# Patient Record
Sex: FEMALE | Race: WHITE | NOT HISPANIC OR LATINO | URBAN - METROPOLITAN AREA
[De-identification: names, ages, dates, MRNs, and addresses within clinical notes are randomized per-mention and may not be internally consistent; named-entity substitution may affect disease eponyms.]

---

## 2020-01-08 ENCOUNTER — TRANSFERRED RECORDS (OUTPATIENT)
Dept: HEALTH INFORMATION MANAGEMENT | Facility: CLINIC | Age: 25
End: 2020-01-08

## 2021-03-05 ENCOUNTER — OFFICE VISIT (OUTPATIENT)
Dept: PLASTIC SURGERY | Facility: CLINIC | Age: 26
End: 2021-03-05

## 2021-03-05 DIAGNOSIS — T18.0XXA FOREIGN BODY IN MOUTH, INITIAL ENCOUNTER: Primary | ICD-10-CM

## 2021-03-05 NOTE — LETTER
3/5/2021       RE: Angelina Correa  [address] [address]     Dear Colleague,    Thank you for referring your patient, Angelina Correa, to the [facility] at [facility]. Please see a copy of my visit note below.    Facial Plastic and Reconstructive Surgery Consultation    Referring Provider:   Referred Self, MD  No address on file    HPI:   I had the pleasure of seeing Angelina Correa today in clinic for consultation for possible chin implant complication.     Angelina Correa is a 25 year old female who in January of 2020 underwent a rhinoplasty and chin augmentation with silicone implant. She has noticed that it feels that the implant has become dependent and added more vertical height than projection. She also notes that she can feel the implant intraorally bilaterally by her gums. She feels like they have become more prominent and she is concerned that the implant may become exposed intraorally.    She would like to evaluate the position of the implant and would like to see if it needs correction.     Meds: Citalopram 20 mg  PMH: Depression  PSH: Rhinoplasty with chin augmentation with silicone implant    NKDA     Review Of Systems  ROS: 10 point ROS neg other than the symptoms noted above in the HPI.    There is no problem list on file for this patient.    No past surgical history on file.  No current outpatient medications on file.     Patient has no known allergies.  Social History     Socioeconomic History     Marital status: Single     Spouse name: Not on file     Number of children: Not on file     Years of education: Not on file     Highest education level: Not on file   Occupational History     Not on file   Social Needs     Financial resource strain: Not on file     Food insecurity     Worry: Not on file     Inability: Not on file     Transportation needs     Medical: Not on file     Non-medical: Not on  file   Tobacco Use     Smoking status: Current Every Day Smoker     Packs/day: 0.25     Smokeless tobacco: Never Used     Tobacco comment: couple cigs/day    Substance and Sexual Activity     Alcohol use: Yes     Comment: occ     Drug use: No     Sexual activity: Yes     Partners: Male   Lifestyle     Physical activity     Days per week: Not on file     Minutes per session: Not on file     Stress: Not on file   Relationships     Social connections     Talks on phone: Not on file     Gets together: Not on file     Attends Sikhism service: Not on file     Active member of club or organization: Not on file     Attends meetings of clubs or organizations: Not on file     Relationship status: Not on file     Intimate partner violence     Fear of current or ex partner: Not on file     Emotionally abused: Not on file     Physically abused: Not on file     Forced sexual activity: Not on file   Other Topics Concern     Parent/sibling w/ CABG, MI or angioplasty before 65F 55M? Not Asked   Social History Narrative     Not on file     No family history on file.    PE:  Alert and Oriented, Answering Questions Appropriately  Atraumatic, Normocephalic, Face Symmetric  Skin: Kaur 2  Facial Nerve Intact and facial movement symmetric  EOM's, PEERL  Nasal Exam: contour deformity of the profile with a dependent infratip lobule, tip nasal skin with notable scar, history of open septorhinoplasty with columellar scar.   Chin: wide long menton, with boxy shape, profile with length, with deficiency in projection, implant in palpable in a dependent position off of the border of the mandible.   Lips/Teeth/Toungue/Gums: Lips intact, Normal Dentition, Occlusion intact, Oral mucosa intact, no lesions/ulcerations/masses, Tongue mobile  The extended wings of the implant are palpable just on the alveolar ridge inferior to the molars, this is in a significantly more superior position and not along the mandibular border  Chest: No wheezing,  cyanosis, or stridor  Card: Normal upper extremity pulses and capillary refill, not diaphoretic  Neuro/Psych: CN's 2-12 intact, Moves all extremities, ambulation in intact, positive affect, no notable muscle weakness       IMPRESSION:    Malpositioned chin implant, with concern for future intraoral extrusion.      PLAN:    Angelina Correa has a malpositioned chin implant, which would need repositioning. This is in a position that can possibly cause the complication of intraoral extrusion if it is not corrected.     I discussed this with her today. I would use the submental incision and reposition the implant if it is appropriate. If the size and shape are the concern, then a new implant would need to be placed.         Photodocumentation was obtained.     I spent a total of 45  minutes face-to-face with Angelina Correa during today's office visit.  Over 50% of this time was spent counseling the patient and/or coordinating care regarding chin implant complications.  See note for details.        Again, thank you for allowing me to participate in the care of your patient.      Sincerely,    Jyotsna Singletary MD

## 2021-03-05 NOTE — LETTER
March 5, 2021  Re: Angelina Correa  1995    Dear Dr. Santiago,    Thank you so much for referring Angelina Correa to the [facility]. I had the pleasure of visiting with Angelina today.     Attached you will find a copy of my note. Please feel free to reach out to me with any questions, [phone].     Facial Plastic and Reconstructive Surgery Consultation    Referring Provider:   Referred Self, MD  No address on file    HPI:   I had the pleasure of seeing Angelina Correa today in clinic for consultation for possible chin implant complication.     Angelina Correa is a 25 year old female who in January of 2020 underwent a rhinoplasty and chin augmentation with silicone implant. She has noticed that it feels that the implant has become dependent and added more vertical height than projection. She also notes that she can feel the implant intraorally bilaterally by her gums. She feels like they have become more prominent and she is concerned that the implant may become exposed intraorally.    She would like to evaluate the position of the implant and would like to see if it needs correction.     Meds: Citalopram 20 mg  PMH: Depression  PSH: Rhinoplasty with chin augmentation with silicone implant    NKDA     Review Of Systems  ROS: 10 point ROS neg other than the symptoms noted above in the HPI.    There is no problem list on file for this patient.    No past surgical history on file.  No current outpatient medications on file.     Patient has no known allergies.  Social History     Socioeconomic History     Marital status: Single     Spouse name: Not on file     Number of children: Not on file     Years of education: Not on file     Highest education level: Not on file   Occupational History     Not on file   Social Needs     Financial resource strain: Not on file     Food insecurity     Worry: Not on file     Inability: Not on file     Transportation needs     Medical: Not on file      Non-medical: Not on file   Tobacco Use     Smoking status: Current Every Day Smoker     Packs/day: 0.25     Smokeless tobacco: Never Used     Tobacco comment: couple cigs/day    Substance and Sexual Activity     Alcohol use: Yes     Comment: occ     Drug use: No     Sexual activity: Yes     Partners: Male   Lifestyle     Physical activity     Days per week: Not on file     Minutes per session: Not on file     Stress: Not on file   Relationships     Social connections     Talks on phone: Not on file     Gets together: Not on file     Attends Episcopal service: Not on file     Active member of club or organization: Not on file     Attends meetings of clubs or organizations: Not on file     Relationship status: Not on file     Intimate partner violence     Fear of current or ex partner: Not on file     Emotionally abused: Not on file     Physically abused: Not on file     Forced sexual activity: Not on file   Other Topics Concern     Parent/sibling w/ CABG, MI or angioplasty before 65F 55M? Not Asked   Social History Narrative     Not on file     No family history on file.    PE:  Alert and Oriented, Answering Questions Appropriately  Atraumatic, Normocephalic, Face Symmetric  Skin: Kaur 2  Facial Nerve Intact and facial movement symmetric  EOM's, PEERL  Nasal Exam: contour deformity of the profile with a dependent infratip lobule, tip nasal skin with notable scar, history of open septorhinoplasty with columellar scar.   Chin: wide long menton, with boxy shape, profile with length, with deficiency in projection, implant in palpable in a dependent position off of the border of the mandible.   Lips/Teeth/Toungue/Gums: Lips intact, Normal Dentition, Occlusion intact, Oral mucosa intact, no lesions/ulcerations/masses, Tongue mobile  The extended wings of the implant are palpable just on the alveolar ridge inferior to the molars, this is in a significantly more superior position and not along the mandibular  border  Chest: No wheezing, cyanosis, or stridor  Card: Normal upper extremity pulses and capillary refill, not diaphoretic  Neuro/Psych: CN's 2-12 intact, Moves all extremities, ambulation in intact, positive affect, no notable muscle weakness       IMPRESSION:    Malpositioned chin implant, with concern for future intraoral extrusion.      PLAN:    Angelina Correa has a malpositioned chin implant, which would need repositioning. This is in a position that can possibly cause the complication of intraoral extrusion if it is not corrected.     I discussed this with her today. I would use the submental incision and reposition the implant if it is appropriate. If the size and shape are the concern, then a new implant would need to be placed.         Photodocumentation was obtained.     I spent a total of 45  minutes face-to-face with Angelina Correa during today's office visit.  Over 50% of this time was spent counseling the patient and/or coordinating care regarding chin implant complications.  See note for details.              Your trust in our practice and care is much appreciated.    Sincerely,  Jyotsna Singletary MD

## 2021-03-05 NOTE — PROGRESS NOTES
Facial Plastic and Reconstructive Surgery Consultation    Referring Provider:   Referred Self, MD  No address on file    HPI:   I had the pleasure of seeing Angelina Correa today in clinic for consultation for possible chin implant complication.     Angelina Correa is a 25 year old female who in January of 2020 underwent a rhinoplasty and chin augmentation with silicone implant. She has noticed that it feels that the implant has become dependent and added more vertical height than projection. She also notes that she can feel the implant intraorally bilaterally by her gums. She feels like they have become more prominent and she is concerned that the implant may become exposed intraorally.    She would like to evaluate the position of the implant and would like to see if it needs correction.     Meds: Citalopram 20 mg  PMH: Depression  PSH: Rhinoplasty with chin augmentation with silicone implant    NKDA     Review Of Systems  ROS: 10 point ROS neg other than the symptoms noted above in the HPI.    There is no problem list on file for this patient.    No past surgical history on file.  No current outpatient medications on file.     Patient has no known allergies.  Social History     Socioeconomic History     Marital status: Single     Spouse name: Not on file     Number of children: Not on file     Years of education: Not on file     Highest education level: Not on file   Occupational History     Not on file   Social Needs     Financial resource strain: Not on file     Food insecurity     Worry: Not on file     Inability: Not on file     Transportation needs     Medical: Not on file     Non-medical: Not on file   Tobacco Use     Smoking status: Current Every Day Smoker     Packs/day: 0.25     Smokeless tobacco: Never Used     Tobacco comment: couple cigs/day    Substance and Sexual Activity     Alcohol use: Yes     Comment: occ     Drug use: No     Sexual activity: Yes     Partners: Male   Lifestyle      Physical activity     Days per week: Not on file     Minutes per session: Not on file     Stress: Not on file   Relationships     Social connections     Talks on phone: Not on file     Gets together: Not on file     Attends Lutheran service: Not on file     Active member of club or organization: Not on file     Attends meetings of clubs or organizations: Not on file     Relationship status: Not on file     Intimate partner violence     Fear of current or ex partner: Not on file     Emotionally abused: Not on file     Physically abused: Not on file     Forced sexual activity: Not on file   Other Topics Concern     Parent/sibling w/ CABG, MI or angioplasty before 65F 55M? Not Asked   Social History Narrative     Not on file     No family history on file.    PE:  Alert and Oriented, Answering Questions Appropriately  Atraumatic, Normocephalic, Face Symmetric  Skin: Kaur 2  Facial Nerve Intact and facial movement symmetric  EOM's, PEERL  Nasal Exam: contour deformity of the profile with a dependent infratip lobule, tip nasal skin with notable scar, history of open septorhinoplasty with columellar scar.   Chin: wide long menton, with boxy shape, profile with length, with deficiency in projection, implant in palpable in a dependent position off of the border of the mandible.   Lips/Teeth/Toungue/Gums: Lips intact, Normal Dentition, Occlusion intact, Oral mucosa intact, no lesions/ulcerations/masses, Tongue mobile  The extended wings of the implant are palpable just on the alveolar ridge inferior to the molars, this is in a significantly more superior position and not along the mandibular border  Chest: No wheezing, cyanosis, or stridor  Card: Normal upper extremity pulses and capillary refill, not diaphoretic  Neuro/Psych: CN's 2-12 intact, Moves all extremities, ambulation in intact, positive affect, no notable muscle weakness       IMPRESSION:    Malpositioned chin implant, with concern for future intraoral  extrusion.      PLAN:    Angelina Correa has a malpositioned chin implant, which would need repositioning. This is in a position that can possibly cause the complication of intraoral extrusion if it is not corrected.     I discussed this with her today. I would use the submental incision and reposition the implant if it is appropriate. If the size and shape are the concern, then a new implant would need to be placed.         Photodocumentation was obtained.     I spent a total of 45  minutes face-to-face with Angelina Correa during today's office visit.  Over 50% of this time was spent counseling the patient and/or coordinating care regarding chin implant complications.  See note for details.

## 2021-03-15 NOTE — NURSING NOTE
Photodocumentation obtained.    Will work to obtain PA for removal and repositioning of chin implant due to malposition initially.    Pt will have original op note sent to us.    Veronica Nuñez RN  3/15/2021 12:56 PM

## 2021-03-26 ENCOUNTER — TELEPHONE (OUTPATIENT)
Dept: OTOLARYNGOLOGY | Facility: CLINIC | Age: 26
End: 2021-03-26

## 2021-03-26 NOTE — TELEPHONE ENCOUNTER
Returned call to pt re: scheduling surgery for Chin augmentation with repositioning of implant and cosmetic nasal surgery.    I told pt that we are currently trying to get prior auth for the chin augmentation d/t her history of a malpositioned implant.    Dr. Singletary would like pt to come in for another consult to address her cosmetic desires for nasal surgery.  She and Dr. Singletary primarily discussed the chin at her previous appt, so another consult to discuss the nose is needed.  I told the pt that there would be no cosmetic fee for this visit.    Pt agreeable to this plan and will call back when ready to schedule the appt with Dr. Singletary.    Veronica Nuñez RN  3/26/2021 4:17 PM      
normal...

## 2021-03-29 DIAGNOSIS — T85.698A: Primary | ICD-10-CM

## 2021-03-29 RX ORDER — CEFAZOLIN SODIUM 2 G/50ML
2 SOLUTION INTRAVENOUS
Status: CANCELLED | OUTPATIENT
Start: 2021-03-29

## 2021-03-29 RX ORDER — CEFAZOLIN SODIUM 2 G/50ML
2 SOLUTION INTRAVENOUS SEE ADMIN INSTRUCTIONS
Status: CANCELLED | OUTPATIENT
Start: 2021-03-29

## 2021-04-12 ENCOUNTER — HOSPITAL ENCOUNTER (OUTPATIENT)
Facility: AMBULATORY SURGERY CENTER | Age: 26
End: 2021-04-12
Attending: OTOLARYNGOLOGY
Payer: COMMERCIAL

## 2021-04-12 DIAGNOSIS — T85.698A: ICD-10-CM

## 2021-06-11 ENCOUNTER — OFFICE VISIT (OUTPATIENT)
Dept: PLASTIC SURGERY | Facility: CLINIC | Age: 26
End: 2021-06-11
Payer: COMMERCIAL

## 2021-06-11 DIAGNOSIS — Z41.1 ENCOUNTER FOR COSMETIC PROCEDURE: Primary | ICD-10-CM

## 2021-06-14 ENCOUNTER — DOCUMENTATION ONLY (OUTPATIENT)
Dept: OTOLARYNGOLOGY | Facility: CLINIC | Age: 26
End: 2021-06-14

## 2021-06-14 NOTE — PROGRESS NOTES
Cosmetic quote drafted for Revision Rhinoplasty with Cadaver Rib Graft vs Ear Graft, Removal and Replacement of Chin Implant (see Media tab).    Quote was emailed to pt, per her request, at [email or web address]    Pt to call the clinic if she has any questions re: the quote, and/or if she's ready to proceed with cosmetic surgery.    Veornica Nuñez RN  6/14/2021 1:22 PM

## 2021-06-21 ENCOUNTER — DOCUMENTATION ONLY (OUTPATIENT)
Dept: OTOLARYNGOLOGY | Facility: CLINIC | Age: 26
End: 2021-06-21

## 2021-06-21 NOTE — PROGRESS NOTES
Pt scheduled to have Cosmetic Revision Rhinoplasty with possible Cadaver Rib vs Ear Graft, Removal and Replacement of Chin Implant on Monday, 8/9/21 at [facility] with Dr. Singletary.    $500 non-refundable deposit collected to secure the surgery date.    Veronica Nuñez RN  6/21/2021 2:30 PM

## 2021-06-28 ENCOUNTER — DOCUMENTATION ONLY (OUTPATIENT)
Dept: OTOLARYNGOLOGY | Facility: CLINIC | Age: 26
End: 2021-06-28

## 2021-06-28 NOTE — PROGRESS NOTES
Pt scheduled to have surgery on 8/9/21 at [facility] with Dr. Singletary.    Orders faxed to [facility].    Surgery packet emailed to pt at [email or web address].    Veronica Nuñez RN  6/28/2021 12:20 PM

## 2021-07-23 ENCOUNTER — DOCUMENTATION ONLY (OUTPATIENT)
Dept: OTOLARYNGOLOGY | Facility: CLINIC | Age: 26
End: 2021-07-23

## 2021-07-23 NOTE — PROGRESS NOTES
FMLA and Return to Work paperwork faxed to Dr. Scribbles at [phone]    Pt scheduled to have Cosmetic Revision Rhinoplasty, Possible Conchal Ear vs Cadaver Rib Graft, Removal and Replacement of Chin Implant on 8/9/21 @ [facility].    Veronica Nuñez RN  7/23/2021 3:43 PM

## 2021-09-21 NOTE — PROGRESS NOTES
Facial Plastic and Reconstructive Surgery Consultation    Referring Provider:   Referred Self, MD  No address on file    HPI:   I had the pleasure of seeing Angelina Correa today in clinic for consultation for nasal surgery and revision of chin augmentation.  Angelina Correa is a 26 year old female who in 1/8/2020 underwent open reductive rhinoplasty with extended anatomical large chin implant with Dr. Penn. She had reduction of a dorsal convexity in addition to reduction of the tip for tip definition.     She states that her breathing through her nose has been challenging since her surgery. She feels constricted moving air from either side of her nose. She also feels that the chin implant is in poor position. She notes that it is wide and that it can be felt at the edge of her jaw.        Review Of Systems  ROS: 10 point ROS neg other than the symptoms noted above in the HPI.    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  No current outpatient medications on file.     Patient has no known allergies.  Social History     Socioeconomic History     Marital status: Single     Spouse name: Not on file     Number of children: Not on file     Years of education: Not on file     Highest education level: Not on file   Occupational History     Not on file   Tobacco Use     Smoking status: Current Every Day Smoker     Packs/day: 0.25     Smokeless tobacco: Never Used     Tobacco comment: couple cigs/day    Substance and Sexual Activity     Alcohol use: Yes     Comment: occ     Drug use: No     Sexual activity: Yes     Partners: Male   Other Topics Concern     Parent/sibling w/ CABG, MI or angioplasty before 65F 55M? Not Asked   Social History Narrative     Not on file     Social Determinants of Health     Financial Resource Strain:      Difficulty of Paying Living Expenses:    Food Insecurity:      Worried About Running  Out of Food in the Last Year:      Ran Out of Food in the Last Year:    Transportation Needs:      Lack of Transportation (Medical):      Lack of Transportation (Non-Medical):    Physical Activity:      Days of Exercise per Week:      Minutes of Exercise per Session:    Stress:      Feeling of Stress :    Social Connections:      Frequency of Communication with Friends and Family:      Frequency of Social Gatherings with Friends and Family:      Attends Yazdanism Services:      Active Member of Clubs or Organizations:      Attends Club or Organization Meetings:      Marital Status:    Intimate Partner Violence:      Fear of Current or Ex-Partner:      Emotionally Abused:      Physically Abused:      Sexually Abused:      No family history on file.    PE:  Alert and Oriented, Answering Questions Appropriately  Atraumatic, Normocephalic, Face Symmetric  Skin: Kaur 2  Facial Nerve Intact and facial movement symmetric  EOM's, PEERL  Nasal Exam:  Narrow nasal vault with narrow midvault, loss of internal nasal valve support with collapse, modified jayce maneuver leads to a significant improvement in nasal breathing, no mucopurulence or polyps, no masses  Chin: excessive width on anterior view, edge of the implant is palpable off the mandible inferiorly, this leads to a dependence of the chin in this area.   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

## 2022-04-11 ENCOUNTER — DOCUMENTATION ONLY (OUTPATIENT)
Dept: OTOLARYNGOLOGY | Facility: CLINIC | Age: 27
End: 2022-04-11
Payer: COMMERCIAL

## 2022-04-11 NOTE — PROGRESS NOTES
Pt scheduled to have surgery with Dr. Singletary on 6/14/22 at [facility].    Surgery orders faxed to [facility].    Surgical packet emailed to pt ([email or web address]).    Pt has an appt scheduled with Dr. Singletary on 5/20/22 to further discuss details of surgery.    Veronica Nuñez RN  4/11/2022 1:35 PM

## 2022-05-20 ENCOUNTER — OFFICE VISIT (OUTPATIENT)
Dept: PLASTIC SURGERY | Facility: CLINIC | Age: 27
End: 2022-05-20
Payer: COMMERCIAL

## 2022-05-20 DIAGNOSIS — Z41.1 ELECTIVE PROCEDURE FOR UNACCEPTABLE COSMETIC APPEARANCE: Primary | ICD-10-CM

## 2022-05-20 NOTE — PROGRESS NOTES
Facial Plastic and Reconstructive Surgery      Angelina Correa came in today to discuss the plan.  We morphed and looked together at her image and changes.    Chin: Submental approach, remove old implant, replace with medium or large, more projection centrally, less width    Nose: Superior dorsal take down and define glabella, increase tip projection, decrease infratip dependence, possible ear cartilage graft    She is in agreement with this plan .

## 2022-05-20 NOTE — LETTER
5/20/2022       RE: Angelina Correa  [address] [address]     Dear Colleague,    Thank you for referring your patient, Angelina Correa, to the [facility] at [facility]. Please see a copy of my visit note below.    Facial Plastic and Reconstructive Surgery      Angelina Correa came in today to discuss the plan.  We morphed and looked together at her image and changes.    Chin: Submental approach, remove old implant, replace with medium or large, more projection centrally, less width    Nose: Superior dorsal take down and define glabella, increase tip projection, decrease infratip dependence, possible ear cartilage graft    She is in agreement with this plan .         Sincerely,    Jyotsna Singletary MD

## 2022-05-20 NOTE — LETTER
May 20, 2022  Re: Angelina Correa  1995    Dear Dr. Santiago,    Thank you so much for referring Angelina Correa to the [facility]. I had the pleasure of visiting with Angelina today.     Attached you will find a copy of my note. Please feel free to reach out to me with any questions, [phone].     Facial Plastic and Reconstructive Surgery      Angelina Correa came in today to discuss the plan.  We morphed and looked together at her image and changes.    Chin: Submental approach, remove old implant, replace with medium or large, more projection centrally, less width    Nose: Superior dorsal take down and define glabella, increase tip projection, decrease infratip dependence, possible ear cartilage graft    She is in agreement with this plan .         Your trust in our practice and care is much appreciated.    Sincerely,  Jyotsna Singletary MD

## 2022-05-20 NOTE — LETTER
5/20/2022       RE: Angelina Correa  [address] [address]     Dear Colleague,    Thank you for referring your patient, Angelina Correa, to the [facility] at [facility]. Please see a copy of my visit note below.    Facial Plastic and Reconstructive Surgery      Angelina Correa came in today to discuss the plan.  We morphed and looked together at her image and changes.    Chin: Submental approach, remove old implant, replace with medium or large, more projection centrally, less width    Nose: Superior dorsal take down and define glabella, increase tip projection, decrease infratip dependence, possible ear cartilage graft    She is in agreement with this plan .       Sincerely,    Jyotsna Singletary MD

## 2022-06-14 ENCOUNTER — TRANSFERRED RECORDS (OUTPATIENT)
Dept: HEALTH INFORMATION MANAGEMENT | Facility: CLINIC | Age: 27
End: 2022-06-14

## 2022-06-20 ENCOUNTER — DOCUMENTATION ONLY (OUTPATIENT)
Dept: OTOLARYNGOLOGY | Facility: CLINIC | Age: 27
End: 2022-06-20

## 2022-06-20 NOTE — PROGRESS NOTES
Completed [facility] paperwork completed and emailed to pt on 6/17/22.    Veronica Nuñez RN  6/20/2022 10:59 AM

## 2022-06-21 ENCOUNTER — ALLIED HEALTH/NURSE VISIT (OUTPATIENT)
Dept: PLASTIC SURGERY | Facility: CLINIC | Age: 27
End: 2022-06-21

## 2022-06-21 DIAGNOSIS — Z98.890 POSTOPERATIVE STATE: Primary | ICD-10-CM

## 2022-06-21 NOTE — PROGRESS NOTES
Pt comes in POD # 7 s/p Cosmetic Revision Rhinoplasty, Conchal Cartilage Graft, Removal and Replacement of Chin Implant.    Nasal cast removed.  Nose is appropriately swollen and tender.  Left post auricular incision well approximated and intact with absorbable sutures.  Columella more swollen on the left.  Encouraged pt to continue with nasal saline to keep nasal secretions moist.    Submental sutures removed.  Pt has an appropriate amount of swelling throughout lower face.  Able to smile and show bottom teeth, although she says it feels tight.    Pt instructed to keep a sheen of Aquaphor on all incision lines.    F/u with Dr. Singletary in 3-4 wks.    Veronica Nuñez RN  6/21/2022 10:28 AM

## 2022-07-08 ENCOUNTER — OFFICE VISIT (OUTPATIENT)
Dept: PLASTIC SURGERY | Facility: CLINIC | Age: 27
End: 2022-07-08

## 2022-07-08 DIAGNOSIS — Z98.890 POSTOPERATIVE STATE: Primary | ICD-10-CM

## 2022-07-08 NOTE — LETTER
7/8/2022       RE: Angelina Correa  [address] [address]     Dear Colleague,    Thank you for referring your patient, Angelina Correa, to the [facility] at [facility]. Please see a copy of my visit note below.    Facial Plastic and Reconstructive Surgery      Angelina Correa is her for post op check of her revision rhinoplasty and chin implant revision. She is doing well and is pleased. Her incision is bit thick, she will massage it and use silicone. I also recommended using the jaw bra intermittently. Her nose is healing well.    I will see her back in three months.       Sincerely,    Jyotsna Singletary MD

## 2022-07-08 NOTE — LETTER
July 8, 2022  Re: Angelina Correa  1995        Thank you so much for referring Angelina Correa to the [facility]. I had the pleasure of visiting with Angelina today.     Attached you will find a copy of my note. Please feel free to reach out to me with any questions, [phone].       Facial Plastic and Reconstructive Surgery      Angelina Correa is her for post op check of her revision rhinoplasty and chin implant revision. She is doing well and is pleased. Her incision is bit thick, she will massage it and use silicone. I also recommended using the jaw bra intermittently. Her nose is healing well.    I will see her back in three months.       Sincerely,    Jyotsna Singletary MD

## 2022-07-08 NOTE — PROGRESS NOTES
Facial Plastic and Reconstructive Surgery      Angelina Correa is her for post op check of her revision rhinoplasty and chin implant revision. She is doing well and is pleased. Her incision is bit thick, she will massage it and use silicone. I also recommended using the jaw bra intermittently. Her nose is healing well.    I will see her back in three months.